# Patient Record
Sex: MALE | Race: WHITE | NOT HISPANIC OR LATINO | Employment: FULL TIME | URBAN - METROPOLITAN AREA
[De-identification: names, ages, dates, MRNs, and addresses within clinical notes are randomized per-mention and may not be internally consistent; named-entity substitution may affect disease eponyms.]

---

## 2023-03-28 LAB
THYROTROPIN (MIU/L) IN SER/PLAS BY DETECTION LIMIT <= 0.05 MIU/L: 1.25 MIU/L (ref 0.44–3.98)
THYROXINE (T4) FREE (NG/DL) IN SER/PLAS: 1.39 NG/DL (ref 0.78–1.48)
TRIIODOTHYRONINE (T3) FREE (PG/ML) IN SER/PLAS: 3.5 PG/ML (ref 2.3–4.2)

## 2023-06-03 PROBLEM — M54.16 LUMBAR RADICULOPATHY: Status: ACTIVE | Noted: 2023-06-03

## 2023-06-03 PROBLEM — K21.9 GASTROESOPHAGEAL REFLUX DISEASE: Status: ACTIVE | Noted: 2023-06-03

## 2023-06-03 PROBLEM — J01.90 ACUTE SINUSITIS: Status: RESOLVED | Noted: 2023-06-03 | Resolved: 2023-06-03

## 2023-06-03 PROBLEM — R10.9 ABDOMINAL PAIN, LEFT LATERAL: Status: RESOLVED | Noted: 2023-06-03 | Resolved: 2023-06-03

## 2023-06-03 PROBLEM — I10 BENIGN ESSENTIAL HYPERTENSION: Status: ACTIVE | Noted: 2023-06-03

## 2023-06-03 PROBLEM — S86.019A ACHILLES RUPTURE: Status: ACTIVE | Noted: 2023-06-03

## 2023-06-03 PROBLEM — B35.1 ONYCHOMYCOSIS: Status: ACTIVE | Noted: 2023-06-03

## 2023-06-03 PROBLEM — H66.92 LEFT OTITIS MEDIA: Status: RESOLVED | Noted: 2023-06-03 | Resolved: 2023-06-03

## 2023-06-03 PROBLEM — K59.00 CONSTIPATION: Status: RESOLVED | Noted: 2023-06-03 | Resolved: 2023-06-03

## 2023-06-03 PROBLEM — K64.9 HEMORRHOID: Status: ACTIVE | Noted: 2023-06-03

## 2023-06-03 PROBLEM — R10.32 LEFT LOWER QUADRANT ABDOMINAL PAIN: Status: RESOLVED | Noted: 2023-06-03 | Resolved: 2023-06-03

## 2023-06-03 PROBLEM — R03.0 ELEVATED BLOOD PRESSURE READING: Status: RESOLVED | Noted: 2023-06-03 | Resolved: 2023-06-03

## 2023-06-03 PROBLEM — K52.9 GASTROENTERITIS, ACUTE: Status: RESOLVED | Noted: 2023-06-03 | Resolved: 2023-06-03

## 2023-06-03 PROBLEM — K57.90 DIVERTICULOSIS: Status: ACTIVE | Noted: 2023-06-03

## 2023-06-03 PROBLEM — M47.815: Status: ACTIVE | Noted: 2023-06-03

## 2023-06-03 PROBLEM — S29.019A THORACIC MYOFASCIAL STRAIN: Status: ACTIVE | Noted: 2023-06-03

## 2023-06-03 PROBLEM — E05.90 HYPERTHYROIDISM: Status: ACTIVE | Noted: 2023-06-03

## 2023-06-03 PROBLEM — R63.5 WEIGHT GAIN: Status: RESOLVED | Noted: 2023-06-03 | Resolved: 2023-06-03

## 2023-06-03 PROBLEM — F41.9 ACUTE ANXIETY: Status: ACTIVE | Noted: 2023-06-03

## 2023-06-03 PROBLEM — L50.9 URTICARIA: Status: ACTIVE | Noted: 2023-06-03

## 2023-06-03 PROBLEM — S63.106A THUMB DISLOCATION: Status: RESOLVED | Noted: 2023-06-03 | Resolved: 2023-06-03

## 2023-06-03 PROBLEM — M47.814 DEGENERATIVE ARTHRITIS OF THORACIC SPINE: Status: ACTIVE | Noted: 2023-06-03

## 2023-06-03 RX ORDER — ALPRAZOLAM 1 MG/1
1 TABLET ORAL 2 TIMES DAILY
COMMUNITY

## 2023-06-13 ENCOUNTER — APPOINTMENT (OUTPATIENT)
Dept: PRIMARY CARE | Facility: CLINIC | Age: 63
End: 2023-06-13
Payer: COMMERCIAL

## 2023-08-22 ENCOUNTER — OFFICE VISIT (OUTPATIENT)
Dept: PRIMARY CARE | Facility: CLINIC | Age: 63
End: 2023-08-22
Payer: COMMERCIAL

## 2023-08-22 VITALS
HEART RATE: 65 BPM | HEIGHT: 71 IN | SYSTOLIC BLOOD PRESSURE: 128 MMHG | WEIGHT: 220 LBS | DIASTOLIC BLOOD PRESSURE: 81 MMHG | BODY MASS INDEX: 30.8 KG/M2

## 2023-08-22 DIAGNOSIS — E66.9 OBESITY (BMI 30.0-34.9): Primary | ICD-10-CM

## 2023-08-22 DIAGNOSIS — F41.9 ACUTE ANXIETY: ICD-10-CM

## 2023-08-22 PROBLEM — R51.9 TEMPORAL HEADACHE: Status: RESOLVED | Noted: 2019-04-17 | Resolved: 2023-08-22

## 2023-08-22 PROBLEM — K11.1 PAROTID GLAND ENLARGEMENT: Status: ACTIVE | Noted: 2019-04-17

## 2023-08-22 PROBLEM — Z98.890 STATUS POST FUNCTIONAL ENDOSCOPIC SINUS SURGERY (FESS): Status: RESOLVED | Noted: 2019-04-17 | Resolved: 2023-08-22

## 2023-08-22 PROBLEM — K76.0 NAFLD (NONALCOHOLIC FATTY LIVER DISEASE): Status: ACTIVE | Noted: 2023-08-22

## 2023-08-22 PROBLEM — R51.9 FACIAL PAIN: Status: RESOLVED | Noted: 2019-04-17 | Resolved: 2023-08-22

## 2023-08-22 PROBLEM — F45.8 BRUXISM: Status: ACTIVE | Noted: 2019-04-17

## 2023-08-22 PROCEDURE — 99213 OFFICE O/P EST LOW 20 MIN: CPT | Performed by: FAMILY MEDICINE

## 2023-08-22 PROCEDURE — 1159F MED LIST DOCD IN RCRD: CPT | Performed by: FAMILY MEDICINE

## 2023-08-22 ASSESSMENT — PATIENT HEALTH QUESTIONNAIRE - PHQ9
1. LITTLE INTEREST OR PLEASURE IN DOING THINGS: NOT AT ALL
SUM OF ALL RESPONSES TO PHQ9 QUESTIONS 1 AND 2: 0
2. FEELING DOWN, DEPRESSED OR HOPELESS: NOT AT ALL

## 2023-08-27 PROBLEM — K11.1 PAROTID GLAND ENLARGEMENT: Status: RESOLVED | Noted: 2019-04-17 | Resolved: 2023-08-27

## 2023-08-27 PROBLEM — I10 BENIGN ESSENTIAL HYPERTENSION: Status: RESOLVED | Noted: 2023-06-03 | Resolved: 2023-08-27

## 2023-08-27 NOTE — PROGRESS NOTES
"Assessment and Plan:  Problem List Items Addressed This Visit       Acute anxiety    Current Assessment & Plan     Continue follow-up with psychiatry and         Obesity (BMI 30.0-34.9) - Primary    Current Assessment & Plan     Watch what you eat and include more vegetables on your plate.  Portion control and exercise will help in loosing weight .   It is recommended to get 150 mins of brisk exercise in a week . 150 mins of exercise per week will help in maintaining your current weight, if you are already working out . Exercise should make your heart rate go up.   Calorie deficit ( spending more calories than eating ) will result in weight loss    A deficit of 500 calories per day in 7 days would results in 1lbs weight loss per week., Aim for 1-2 lbs weight loss per month.   You can reduce the calorie intake by 250 calories and spend 250 calories by working out which would give you a total deficit of 500 calories     No sugary/ sweetened beverages , portion control , dedicated physical activity atleast 5 times a week advised .                 HPI:  Here for follow-up blood pressure is improved denies chest pain shortness of breath continues to lose weight takes Xanax from psychiatry every day anxiety is under control      ROS   Constitutional:  o weight loss. Negative for chills and fever.   HENT: Negative.     Respiratory: Negative.     Cardiovascular: Negative.    Gastrointestinal: Negative.  Negative for nausea and vomiting.   Endocrine: Negative.    Genitourinary: Negative.    Musculoskeletal: Negative.    Skin: Negative.  Negative for rash.   Allergic/Immunologic: Negative.    Neurological: Negative.    Hematological: Negative.    Psychiatric/Behavioral: Negative.     All other systems reviewed and are negative.      Blood Pressure 128/81   Pulse 65   Height 1.803 m (5' 11\")   Weight 99.8 kg (220 lb)   Body Mass Index 30.68 kg/m²   Body mass index is 30.68 kg/m².  Physical Exam    ENT:      Head: " Normocephalic and atraumatic.      Right Ear: Tympanic membrane normal.      Left Ear: Tympanic membrane normal.      Nose: Nose normal.      Mouth/Throat:      Mouth: Mucous membranes are moist.   Eyes:      Pupils: Pupils are equal, round, and reactive to light.   Cardiovascular:      Rate and Rhythm: Normal rate and regular rhythm.      Pulses: Normal pulses.      Heart sounds: Normal heart sounds.   Pulmonary:      Effort: Pulmonary effort is normal.      Breath sounds: Normal breath sounds.   Abdominal:      General: Abdomen is flat. Bowel sounds are normal.      Palpations: Abdomen is soft.   Musculoskeletal:         General: Normal range of motion.      Cervical back: Normal range of motion and neck supple.   Skin:     General: Skin is warm and dry.      Capillary Refill: Capillary refill takes less than 2 seconds.   Neurological:      General: No focal deficit present.      Mental Status: She is alert and oriented to person, place, and time.   Psychiatric:         Mood and Affect: Mood normal.       Active Problem List  Patient Active Problem List   Diagnosis    Achilles rupture    Acute anxiety    Degenerative arthritis of thoracic spine    Diverticulosis    Gastroesophageal reflux disease    Hemorrhoid    Hyperthyroidism    Lumbar radiculopathy    Onychomycosis    Osteoarthritis of thoracolumbar spine without myelopathy or radiculopathy    Thoracic myofascial strain    Urticaria    Bruxism    NAFLD (nonalcoholic fatty liver disease)    Obesity (BMI 30.0-34.9)    Primary localized osteoarthrosis, lower leg       Comprehensive Medical/Surgical/Social/Family History  Past Medical History:   Diagnosis Date    Abdominal pain, left lateral 06/03/2023    Elevated blood pressure reading 06/03/2023    Facial pain 04/17/2019    Gastroenteritis, acute 06/03/2023    Left lower quadrant abdominal pain 06/03/2023    Left otitis media 06/03/2023    Nervousness     Nervous disorder    Obesity, unspecified 11/26/2021     Class 2 obesity with body mass index (BMI) of 35 to 39.9 without comorbidity    Personal history of other diseases of the musculoskeletal system and connective tissue     History of arthritis    Temporal headache 04/17/2019    Thumb dislocation 06/03/2023    Unspecified disorder of nose and nasal sinuses     Sinus problem     Past Surgical History:   Procedure Laterality Date    KNEE ARTHROSCOPY W/ DEBRIDEMENT  08/25/2015    Knee Arthroscopy (Therapeutic)    OTHER SURGICAL HISTORY  08/25/2015    Primary Repair Of Ruptured Achilles Tendon     Social History     Social History Narrative    Not on file       Allergies and Medications  Amoxicillin  Current Outpatient Medications   Medication Instructions    ALPRAZolam (Xanax) 1 mg tablet 1 tablet, oral, 2 times daily

## 2023-11-21 ENCOUNTER — CLINICAL SUPPORT (OUTPATIENT)
Dept: PRIMARY CARE | Facility: CLINIC | Age: 63
End: 2023-11-21
Payer: COMMERCIAL

## 2023-11-21 DIAGNOSIS — Z12.5 SCREENING PSA (PROSTATE SPECIFIC ANTIGEN): ICD-10-CM

## 2023-11-21 DIAGNOSIS — E05.90 HYPERTHYROIDISM: ICD-10-CM

## 2023-11-21 DIAGNOSIS — E66.9 OBESITY (BMI 30.0-34.9): ICD-10-CM

## 2023-11-21 LAB
ALBUMIN SERPL BCP-MCNC: 4 G/DL (ref 3.4–5)
ALP SERPL-CCNC: 63 U/L (ref 33–136)
ALT SERPL W P-5'-P-CCNC: 19 U/L (ref 10–52)
ANION GAP SERPL CALC-SCNC: 11 MMOL/L (ref 10–20)
AST SERPL W P-5'-P-CCNC: 20 U/L (ref 9–39)
BILIRUB SERPL-MCNC: 0.5 MG/DL (ref 0–1.2)
BUN SERPL-MCNC: 17 MG/DL (ref 6–23)
CALCIUM SERPL-MCNC: 8.8 MG/DL (ref 8.6–10.6)
CHLORIDE SERPL-SCNC: 107 MMOL/L (ref 98–107)
CHOLEST SERPL-MCNC: 184 MG/DL (ref 0–199)
CHOLESTEROL/HDL RATIO: 3.9
CO2 SERPL-SCNC: 25 MMOL/L (ref 21–32)
CREAT SERPL-MCNC: 0.94 MG/DL (ref 0.5–1.3)
ERYTHROCYTE [DISTWIDTH] IN BLOOD BY AUTOMATED COUNT: 12.2 % (ref 11.5–14.5)
GFR SERPL CREATININE-BSD FRML MDRD: 90 ML/MIN/1.73M*2
GLUCOSE SERPL-MCNC: 90 MG/DL (ref 74–99)
HCT VFR BLD AUTO: 45.7 % (ref 41–52)
HDLC SERPL-MCNC: 46.6 MG/DL
HGB BLD-MCNC: 15.1 G/DL (ref 13.5–17.5)
LDLC SERPL CALC-MCNC: 123 MG/DL
MCH RBC QN AUTO: 30.7 PG (ref 26–34)
MCHC RBC AUTO-ENTMCNC: 33 G/DL (ref 32–36)
MCV RBC AUTO: 93 FL (ref 80–100)
NON HDL CHOLESTEROL: 137 MG/DL (ref 0–149)
NRBC BLD-RTO: 0 /100 WBCS (ref 0–0)
PLATELET # BLD AUTO: 211 X10*3/UL (ref 150–450)
POTASSIUM SERPL-SCNC: 4.3 MMOL/L (ref 3.5–5.3)
PROT SERPL-MCNC: 6.5 G/DL (ref 6.4–8.2)
PSA SERPL-MCNC: 2.88 NG/ML
RBC # BLD AUTO: 4.92 X10*6/UL (ref 4.5–5.9)
SODIUM SERPL-SCNC: 139 MMOL/L (ref 136–145)
TRIGL SERPL-MCNC: 72 MG/DL (ref 0–149)
TSH SERPL-ACNC: 1.53 MIU/L (ref 0.44–3.98)
VLDL: 14 MG/DL (ref 0–40)
WBC # BLD AUTO: 4.4 X10*3/UL (ref 4.4–11.3)

## 2023-11-21 PROCEDURE — 85027 COMPLETE CBC AUTOMATED: CPT

## 2023-11-21 PROCEDURE — 36415 COLL VENOUS BLD VENIPUNCTURE: CPT

## 2023-11-21 PROCEDURE — 84153 ASSAY OF PSA TOTAL: CPT

## 2023-11-21 PROCEDURE — 84443 ASSAY THYROID STIM HORMONE: CPT

## 2023-11-21 PROCEDURE — 80053 COMPREHEN METABOLIC PANEL: CPT

## 2023-11-21 PROCEDURE — 80061 LIPID PANEL: CPT

## 2024-02-27 ENCOUNTER — APPOINTMENT (OUTPATIENT)
Dept: PRIMARY CARE | Facility: CLINIC | Age: 64
End: 2024-02-27
Payer: COMMERCIAL

## 2024-04-02 ENCOUNTER — OFFICE VISIT (OUTPATIENT)
Dept: PRIMARY CARE | Facility: CLINIC | Age: 64
End: 2024-04-02
Payer: COMMERCIAL

## 2024-04-02 VITALS
SYSTOLIC BLOOD PRESSURE: 127 MMHG | HEART RATE: 78 BPM | BODY MASS INDEX: 32.81 KG/M2 | HEIGHT: 71 IN | DIASTOLIC BLOOD PRESSURE: 84 MMHG | WEIGHT: 234.4 LBS

## 2024-04-02 DIAGNOSIS — E66.9 OBESITY (BMI 30.0-34.9): ICD-10-CM

## 2024-04-02 DIAGNOSIS — F41.9 ACUTE ANXIETY: ICD-10-CM

## 2024-04-02 DIAGNOSIS — K21.9 GASTROESOPHAGEAL REFLUX DISEASE WITHOUT ESOPHAGITIS: Primary | ICD-10-CM

## 2024-04-02 PROCEDURE — 99214 OFFICE O/P EST MOD 30 MIN: CPT | Performed by: FAMILY MEDICINE

## 2024-04-02 PROCEDURE — 1159F MED LIST DOCD IN RCRD: CPT | Performed by: FAMILY MEDICINE

## 2024-04-02 ASSESSMENT — ENCOUNTER SYMPTOMS
DEPRESSION: 0
OCCASIONAL FEELINGS OF UNSTEADINESS: 0
LOSS OF SENSATION IN FEET: 0

## 2024-04-07 NOTE — PROGRESS NOTES
Chief Complaint/HPI:    Ptient is here for follow up obesity HLD GERD Anxiety Sees Psych  Has not been exercising cholesterol was jigh last time. Denies CP SOB      ROS otherwise negative aside from what was mentioned above in HPI.      Patient Active Problem List   Diagnosis    Achilles rupture    Acute anxiety    Degenerative arthritis of thoracic spine    Diverticulosis    Gastroesophageal reflux disease    Hemorrhoid    Hyperthyroidism    Lumbar radiculopathy    Onychomycosis    Osteoarthritis of thoracolumbar spine without myelopathy or radiculopathy    Thoracic myofascial strain    Urticaria    Bruxism    NAFLD (nonalcoholic fatty liver disease)    Obesity (BMI 30.0-34.9)    Primary localized osteoarthrosis, lower leg     Past Medical History:   Diagnosis Date    Abdominal pain, left lateral 2023    Elevated blood pressure reading 2023    Facial pain 2019    Gastroenteritis, acute 2023    Left lower quadrant abdominal pain 2023    Left otitis media 2023    Nervousness     Nervous disorder    Obesity, unspecified 2021    Class 2 obesity with body mass index (BMI) of 35 to 39.9 without comorbidity    Personal history of other diseases of the musculoskeletal system and connective tissue     History of arthritis    Temporal headache 2019    Thumb dislocation 2023    Unspecified disorder of nose and nasal sinuses     Sinus problem     Past Surgical History:   Procedure Laterality Date    KNEE ARTHROSCOPY W/ DEBRIDEMENT  2015    Knee Arthroscopy (Therapeutic)    OTHER SURGICAL HISTORY  2015    Primary Repair Of Ruptured Achilles Tendon     Family History   Problem Relation Name Age of Onset    No Known Problems Mother      No Known Problems Father       Social History     Tobacco Use    Smoking status: Former     Types: Cigarettes     Quit date:      Years since quittin.2    Smokeless tobacco: Never   Substance Use Topics    Alcohol use: Yes     Drug use: Never         ALLERGIES  Allergies   Allergen Reactions    Amoxicillin Rash         MEDICATIONS  Current Outpatient Medications   Medication Sig Dispense Refill    ALPRAZolam (Xanax) 1 mg tablet Take 1 tablet (1 mg) by mouth 2 times a day.       No current facility-administered medications for this visit.         PHYSICAL EXAM  Visit Vitals  Blood Pressure 127/84   Pulse 78     .FLOWAMB[11   .FLOWAMB[14   Body mass index is 32.69 kg/m².  Gen: Alert, NAD  HEENT:  PERRLA, EOMI, conjunctiva and sclera normal in appearance  Respiratory:  Lungs CTAB  Cardiovascular:  Heart RRR. No M/R/G  Neuro:  Gross motor and sensory intact  Skin:  No suspicious lesions present    ASSESSMENT/PLAN  Problem List Items Addressed This Visit       Acute anxiety    Current Assessment & Plan     Continue follow-up with psychiatry and         Gastroesophageal reflux disease - Primary    Current Assessment & Plan     Has been off med Doing weel         Obesity (BMI 30.0-34.9)           Yrn Gaxiola MD

## 2024-05-06 ENCOUNTER — LAB (OUTPATIENT)
Dept: LAB | Facility: LAB | Age: 64
End: 2024-05-06
Payer: COMMERCIAL

## 2024-05-06 ENCOUNTER — OFFICE VISIT (OUTPATIENT)
Dept: ENDOCRINOLOGY | Facility: CLINIC | Age: 64
End: 2024-05-06
Payer: COMMERCIAL

## 2024-05-06 VITALS
BODY MASS INDEX: 34.05 KG/M2 | HEART RATE: 67 BPM | DIASTOLIC BLOOD PRESSURE: 88 MMHG | SYSTOLIC BLOOD PRESSURE: 153 MMHG | HEIGHT: 71 IN | TEMPERATURE: 97.6 F | WEIGHT: 243.2 LBS

## 2024-05-06 DIAGNOSIS — E05.90 HYPERTHYROIDISM: Primary | ICD-10-CM

## 2024-05-06 DIAGNOSIS — E05.90 HYPERTHYROIDISM: ICD-10-CM

## 2024-05-06 PROCEDURE — 1159F MED LIST DOCD IN RCRD: CPT | Performed by: INTERNAL MEDICINE

## 2024-05-06 PROCEDURE — 99214 OFFICE O/P EST MOD 30 MIN: CPT | Performed by: INTERNAL MEDICINE

## 2024-05-06 PROCEDURE — 84443 ASSAY THYROID STIM HORMONE: CPT

## 2024-05-06 PROCEDURE — 84439 ASSAY OF FREE THYROXINE: CPT

## 2024-05-06 PROCEDURE — 84481 FREE ASSAY (FT-3): CPT

## 2024-05-06 PROCEDURE — 36415 COLL VENOUS BLD VENIPUNCTURE: CPT

## 2024-05-06 NOTE — PROGRESS NOTES
History of Present Illness  Mr. CASH is a 64 year year old male with a history of hyperthyroidism (fall 2021), HTN, GERD, lumbar radiculopathy who presents for thyroid.     diagnosed with hyperthyroidism -- diagnosed around 10/2021.  Has gone into remission  is euthyroid  off methimazole  is feeling very good.     _______________________________________________________  Review of Data:     10/26/21 : TSH <0.01, FT4 = 3.2 (H)  11/11/21 : Thyroid US : no nodules  11/24/21 : **TPO > 1000  12/23/21 : TSH=1.76, FT4=0.71 - on methimazole 10mg BID  1/26/22 : TSH=9.56 (H), FT4=0.67, **TSI=1.0, - on methimazole 10mg BID.  2/17/22 : TSH=10.86(H), FT4=0.72(L), FT3=2.5(nl) -- on methimazole 5mg/d  3/14/22 : TSH=1.55, FT4=1.15, FT3=3.2 -- off methimazole  4/4/22/ : TSH=1.95, FT4=1.07, FT3=3.2 -- off methimazole.  5/10.22: TSH=1.61, FT4=1.07, FT3=3.2 -- off methimazole  7/27/22 : TSH=1.29, FT4=1.19, FT3=2.9 -- off methimazole.  11/1/22 : TSH=1.97  3/27/23 : TSH=1.25, FT4=1.39, FT3=3.5 -- off methimazole.      Current Outpatient Medications:     ALPRAZolam (Xanax) 1 mg tablet, Take 1 tablet (1 mg) by mouth 2 times a day., Disp: , Rfl:     ROS:  System: normal  Eyes : no visual changes  Neck : no tenderness, no new lumps/bumps  Respiratory : no SOB  Cardiovascular : no chest pain, no palpitations  Gastro-Intestinal : no abdominal concerns  Neurological : no numbness or tingling in the extremities  Musculoskeletal : no joint paint, no muscle pain  Skin : no unusual rashes  Psychiatric : no depression, no anxiety  See HPI for Endocrine ROS    Past Medical History:   Diagnosis Date    Abdominal pain, left lateral 06/03/2023    Elevated blood pressure reading 06/03/2023    Facial pain 04/17/2019    Gastroenteritis, acute 06/03/2023    Left lower quadrant abdominal pain 06/03/2023    Left otitis media 06/03/2023    Nervousness     Nervous disorder    Obesity, unspecified 11/26/2021    Class 2 obesity with body mass index (BMI) of 35  "to 39.9 without comorbidity    Personal history of other diseases of the musculoskeletal system and connective tissue     History of arthritis    Temporal headache 2019    Thumb dislocation 2023    Unspecified disorder of nose and nasal sinuses     Sinus problem       Past Surgical History:   Procedure Laterality Date    KNEE ARTHROSCOPY W/ DEBRIDEMENT  2015    Knee Arthroscopy (Therapeutic)    OTHER SURGICAL HISTORY  2015    Primary Repair Of Ruptured Achilles Tendon       Social History     Socioeconomic History    Marital status:      Spouse name: Not on file    Number of children: Not on file    Years of education: Not on file    Highest education level: Not on file   Occupational History    Not on file   Tobacco Use    Smoking status: Former     Current packs/day: 0.00     Types: Cigarettes     Quit date:      Years since quittin.3    Smokeless tobacco: Never   Substance and Sexual Activity    Alcohol use: Yes    Drug use: Never    Sexual activity: Not on file   Other Topics Concern    Not on file   Social History Narrative    Not on file     Social Determinants of Health     Financial Resource Strain: Not on file   Food Insecurity: Not on file   Transportation Needs: Not on file   Physical Activity: Not on file   Stress: Not on file   Social Connections: Not on file   Intimate Partner Violence: Not on file   Housing Stability: Not on file       Objective    Physical Exam:  Blood pressure 153/88, pulse 67, temperature 36.4 °C (97.6 °F), height 1.803 m (5' 11\"), weight 110 kg (243 lb 3.2 oz).  General : alert and oriented X3, no acute distress  Eyes : EOMI   Neck : non tender, supple  Thyroid : no palpable nodules  Heart : regular rate and rhythm  ABD : no obvious abnormalities, soft to palpation  Extremities : no edema  Neuro : normal  Other :    Provider Impressions  Hyperthyroidism : predict secondary to Graves.  TPO AB= (+)  TSI = normal  Thyroid US = normal   "   Euthyroid.  is off methimazole and feeling good  repeat TFT's     If normal labs, will have him go back to his pcp.  Lynnette Contreras MD

## 2024-05-07 LAB
T3FREE SERPL-MCNC: 3.1 PG/ML (ref 2.3–4.2)
T4 FREE SERPL-MCNC: 1.21 NG/DL (ref 0.78–1.48)
TSH SERPL-ACNC: 1.92 MIU/L (ref 0.44–3.98)

## 2024-05-09 ENCOUNTER — TELEPHONE (OUTPATIENT)
Dept: ENDOCRINOLOGY | Facility: CLINIC | Age: 64
End: 2024-05-09
Payer: COMMERCIAL

## 2024-05-10 NOTE — TELEPHONE ENCOUNTER
Review of Data:     10/26/21 : TSH <0.01, FT4 = 3.2 (H)  11/11/21 : Thyroid US : no nodules  11/24/21 : **TPO > 1000  12/23/21 : TSH=1.76, FT4=0.71 - on methimazole 10mg BID  1/26/22 : TSH=9.56 (H), FT4=0.67, **TSI=1.0, - on methimazole 10mg BID.  2/17/22 : TSH=10.86(H), FT4=0.72(L), FT3=2.5(nl) -- on methimazole 5mg/d  3/14/22 : TSH=1.55, FT4=1.15, FT3=3.2 -- off methimazole  4/4/22/ : TSH=1.95, FT4=1.07, FT3=3.2 -- off methimazole.  5/10.22: TSH=1.61, FT4=1.07, FT3=3.2 -- off methimazole  7/27/22 : TSH=1.29, FT4=1.19, FT3=2.9 -- off methimazole.  11/1/22 : TSH=1.97  3/27/23 : TSH=1.25, FT4=1.39, FT3=3.5 -- off methimazole.  11/21/23: TSH=1.53  5/6/24: FT4=1.21, TSH=1.92, FT3=3.2 -- off      His thyroid labs remain normal.  He can go back to his pcp.  Thanks,  Lynnette Contreras MD       
Spoke to patient  Relayed provider's message above   Patient verbalized good understanding     
16-Jan-2020 08:30

## 2025-03-03 ENCOUNTER — APPOINTMENT (OUTPATIENT)
Dept: PRIMARY CARE | Facility: CLINIC | Age: 65
End: 2025-03-03
Payer: COMMERCIAL

## 2025-04-21 ENCOUNTER — APPOINTMENT (OUTPATIENT)
Dept: PRIMARY CARE | Facility: CLINIC | Age: 65
End: 2025-04-21
Payer: COMMERCIAL

## 2025-04-21 VITALS
DIASTOLIC BLOOD PRESSURE: 84 MMHG | WEIGHT: 230 LBS | HEART RATE: 76 BPM | SYSTOLIC BLOOD PRESSURE: 138 MMHG | HEIGHT: 70 IN | BODY MASS INDEX: 32.93 KG/M2

## 2025-04-21 DIAGNOSIS — Z13.220 SCREENING FOR LIPID DISORDERS: ICD-10-CM

## 2025-04-21 DIAGNOSIS — Z13.29 SCREENING FOR THYROID DISORDER: ICD-10-CM

## 2025-04-21 DIAGNOSIS — E55.9 VITAMIN D DEFICIENCY: ICD-10-CM

## 2025-04-21 DIAGNOSIS — Z13.1 SCREENING FOR DIABETES MELLITUS: ICD-10-CM

## 2025-04-21 DIAGNOSIS — Z00.00 ROUTINE GENERAL MEDICAL EXAMINATION AT A HEALTH CARE FACILITY: Primary | ICD-10-CM

## 2025-04-21 PROCEDURE — 1159F MED LIST DOCD IN RCRD: CPT | Performed by: FAMILY MEDICINE

## 2025-04-21 PROCEDURE — 3008F BODY MASS INDEX DOCD: CPT | Performed by: FAMILY MEDICINE

## 2025-04-21 PROCEDURE — 99397 PER PM REEVAL EST PAT 65+ YR: CPT | Performed by: FAMILY MEDICINE

## 2025-04-21 RX ORDER — POLYMYXIN B SULFATE AND TRIMETHOPRIM 1; 10000 MG/ML; [USP'U]/ML
SOLUTION OPHTHALMIC
COMMUNITY
Start: 2024-12-13

## 2025-04-21 RX ORDER — IBUPROFEN 200 MG
600 TABLET ORAL EVERY 8 HOURS PRN
COMMUNITY

## 2025-04-21 ASSESSMENT — PATIENT HEALTH QUESTIONNAIRE - PHQ9
SUM OF ALL RESPONSES TO PHQ9 QUESTIONS 1 AND 2: 0
2. FEELING DOWN, DEPRESSED OR HOPELESS: NOT AT ALL
1. LITTLE INTEREST OR PLEASURE IN DOING THINGS: NOT AT ALL

## 2025-04-21 ASSESSMENT — ENCOUNTER SYMPTOMS
DEPRESSION: 0
OCCASIONAL FEELINGS OF UNSTEADINESS: 0
LOSS OF SENSATION IN FEET: 0

## 2025-04-22 LAB
25(OH)D3+25(OH)D2 SERPL-MCNC: 29 NG/ML (ref 30–100)
ALBUMIN SERPL-MCNC: 4.1 G/DL (ref 3.6–5.1)
ALP SERPL-CCNC: 61 U/L (ref 35–144)
ALT SERPL-CCNC: 12 U/L (ref 9–46)
ANION GAP SERPL CALCULATED.4IONS-SCNC: 9 MMOL/L (CALC) (ref 7–17)
AST SERPL-CCNC: 17 U/L (ref 10–35)
BILIRUB SERPL-MCNC: 0.7 MG/DL (ref 0.2–1.2)
BUN SERPL-MCNC: 15 MG/DL (ref 7–25)
CALCIUM SERPL-MCNC: 9.2 MG/DL (ref 8.6–10.3)
CHLORIDE SERPL-SCNC: 105 MMOL/L (ref 98–110)
CHOLEST SERPL-MCNC: 186 MG/DL
CHOLEST/HDLC SERPL: 4.1 (CALC)
CO2 SERPL-SCNC: 25 MMOL/L (ref 20–32)
CREAT SERPL-MCNC: 0.93 MG/DL (ref 0.7–1.35)
EGFRCR SERPLBLD CKD-EPI 2021: 90 ML/MIN/1.73M2
ERYTHROCYTE [DISTWIDTH] IN BLOOD BY AUTOMATED COUNT: 12.9 % (ref 11–15)
EST. AVERAGE GLUCOSE BLD GHB EST-MCNC: 120 MG/DL
EST. AVERAGE GLUCOSE BLD GHB EST-SCNC: 6.6 MMOL/L
GLUCOSE SERPL-MCNC: 87 MG/DL (ref 65–99)
HBA1C MFR BLD: 5.8 %
HCT VFR BLD AUTO: 43.3 % (ref 38.5–50)
HDLC SERPL-MCNC: 45 MG/DL
HGB BLD-MCNC: 14.8 G/DL (ref 13.2–17.1)
LDLC SERPL CALC-MCNC: 124 MG/DL (CALC)
MCH RBC QN AUTO: 30.6 PG (ref 27–33)
MCHC RBC AUTO-ENTMCNC: 34.2 G/DL (ref 32–36)
MCV RBC AUTO: 89.5 FL (ref 80–100)
NONHDLC SERPL-MCNC: 141 MG/DL (CALC)
PLATELET # BLD AUTO: 187 THOUSAND/UL (ref 140–400)
PMV BLD REES-ECKER: 10.1 FL (ref 7.5–12.5)
POTASSIUM SERPL-SCNC: 4 MMOL/L (ref 3.5–5.3)
PROT SERPL-MCNC: 6.8 G/DL (ref 6.1–8.1)
RBC # BLD AUTO: 4.84 MILLION/UL (ref 4.2–5.8)
SODIUM SERPL-SCNC: 139 MMOL/L (ref 135–146)
TRIGL SERPL-MCNC: 74 MG/DL
TSH SERPL-ACNC: 1.35 MIU/L (ref 0.4–4.5)
VIT B12 SERPL-MCNC: 276 PG/ML (ref 200–1100)
WBC # BLD AUTO: 5.5 THOUSAND/UL (ref 3.8–10.8)

## 2025-04-27 PROBLEM — Z00.00 ROUTINE GENERAL MEDICAL EXAMINATION AT A HEALTH CARE FACILITY: Status: ACTIVE | Noted: 2025-04-27

## 2025-04-27 PROBLEM — Z13.220 SCREENING FOR LIPID DISORDERS: Status: ACTIVE | Noted: 2025-04-27

## 2025-04-27 PROBLEM — E55.9 VITAMIN D DEFICIENCY: Status: ACTIVE | Noted: 2025-04-27

## 2025-04-27 PROBLEM — Z13.1 SCREENING FOR DIABETES MELLITUS: Status: ACTIVE | Noted: 2025-04-27

## 2025-04-27 PROBLEM — Z13.29 SCREENING FOR THYROID DISORDER: Status: ACTIVE | Noted: 2025-04-27

## 2025-04-27 NOTE — PROGRESS NOTES
"Subjective   Patient ID: Moose Cuadra is a 65 y.o. male who presents for Establish Care (No concerns to address today).    Last Physical : __1__ Years ago     Pt's PMH, PSH, SH, FH , meds and allergies was obtained / reviewed and updated .     Dental  Visits : Y  Vision issues : N  Hearing issues : N    Immunizations : Y    Diet :  Could be better   Exercise:    Weight concerns :    Alcohol: as noted in   Tobacco: as noted in   Recreational drugs :  None /as noted in      Metabolic screening   - Lipids   - Glucose   Colonoscopy will be due next year  Has gained 10 pounds  ==================================    Visit Vitals  /84   Pulse 76   Ht 1.778 m (5' 10\")   Wt 104 kg (230 lb)   BMI 33.00 kg/m²   Smoking Status Former   BSA 2.27 m²      =====================  Review of Systems:    Constitutional: no chills, no fever and no night sweats.     Eyes: no blurred vision and no eyesight problems.     ENT: no hearing loss, no nasal congestion, no nasal discharge, no hoarseness and no sore throat.     Cardiovascular: no chest pain, no intermittent leg claudication, no lower extremity edema, no palpitations and no syncope.     Respiratory: no cough, no shortness of breath     Gastrointestinal: no abdominal pain,  no nausea, no rectal pain and no vomiting.     Genitourinary: no dysuria, no change in urinary frequency,.     Musculoskeletal: no arthralgias, and no myalgias.     Integumentary: no new skin lesions and no rashes.     Neurological: no difficulty walking, no headache,     Psychiatric: Has anxiety n, no anhedonia and no substance use disorders.            All other systems have been reviewed and are negative for complaint.    =====================================================    Physical exam :    Constitutional: Alert and in no acute distress. Well developed, well nourished.     Eyes: Normal external exam. Pupils were equal in size, round, reactive to light (PERRL) with normal accommodation and " extraocular movements intact (EOMI).     Ears, Nose, Mouth, and Throat: External inspection of ears and nose: Normal.  Otoscopic examination: Normal.      Neck: No neck mass was observed. Supple.     Cardiovascular: Heart rate and rhythm were normal, normal S1 and S2, no gallops, no murmurs and no pericardial rub    Pulmonary: No respiratory distress. Clear bilateral breath sounds.     Abdomen: Soft nontender; no abdominal mass palpated. No organomegaly.     Musculoskeletal: No joint swelling seen, normal movements of all extremities. Range of motion: Normal.  Muscle strength/tone: Normal.      Skin: Normal skin color and pigmentation, normal skin turgor, and no rash.     Neurologic: Deep tendon reflexes were 2+ and symmetric. Sensation: Normal.     Psychiatric: Judgment and insight: Intact. Mood and affect: Normal.    Lymphatic : Cervical/ axillary/ groin Lns Palpable/ non palpable       Assessment/Plan    Problem List Items Addressed This Visit       Routine general medical examination at a health care facility - Primary    Relevant Orders    Vitamin D 25-Hydroxy,Total (for eval of Vitamin D levels) (Completed)    TSH with reflex to Free T4 if abnormal (Completed)    Lipid Panel (Completed)    Comprehensive Metabolic Panel (Completed)    CBC (Completed)    Vitamin B12 (Completed)    Hemoglobin A1c (Completed)    Screening for diabetes mellitus    Relevant Orders    Vitamin D 25-Hydroxy,Total (for eval of Vitamin D levels) (Completed)    TSH with reflex to Free T4 if abnormal (Completed)    Lipid Panel (Completed)    Comprehensive Metabolic Panel (Completed)    CBC (Completed)    Vitamin B12 (Completed)    Hemoglobin A1c (Completed)    Screening for thyroid disorder    Relevant Orders    Vitamin D 25-Hydroxy,Total (for eval of Vitamin D levels) (Completed)    TSH with reflex to Free T4 if abnormal (Completed)    Lipid Panel (Completed)    Comprehensive Metabolic Panel (Completed)    CBC (Completed)    Vitamin B12  (Completed)    Hemoglobin A1c (Completed)    Screening for lipid disorders    Relevant Orders    Vitamin D 25-Hydroxy,Total (for eval of Vitamin D levels) (Completed)    TSH with reflex to Free T4 if abnormal (Completed)    Lipid Panel (Completed)    Comprehensive Metabolic Panel (Completed)    CBC (Completed)    Vitamin B12 (Completed)    Hemoglobin A1c (Completed)    Vitamin D deficiency    Relevant Orders    Vitamin D 25-Hydroxy,Total (for eval of Vitamin D levels) (Completed)    TSH with reflex to Free T4 if abnormal (Completed)    Lipid Panel (Completed)    Comprehensive Metabolic Panel (Completed)    CBC (Completed)    Vitamin B12 (Completed)    Hemoglobin A1c (Completed)

## 2025-05-19 ENCOUNTER — OFFICE VISIT (OUTPATIENT)
Dept: ORTHOPEDIC SURGERY | Facility: CLINIC | Age: 65
End: 2025-05-19
Payer: COMMERCIAL

## 2025-05-19 ENCOUNTER — HOSPITAL ENCOUNTER (OUTPATIENT)
Dept: RADIOLOGY | Facility: CLINIC | Age: 65
Discharge: HOME | End: 2025-05-19
Payer: COMMERCIAL

## 2025-05-19 VITALS — BODY MASS INDEX: 32.93 KG/M2 | WEIGHT: 230 LBS | HEIGHT: 70 IN

## 2025-05-19 DIAGNOSIS — M25.561 RIGHT KNEE PAIN, UNSPECIFIED CHRONICITY: ICD-10-CM

## 2025-05-19 DIAGNOSIS — M17.10 ARTHRITIS OF KNEE: Primary | ICD-10-CM

## 2025-05-19 DIAGNOSIS — M25.461 EFFUSION OF RIGHT KNEE: ICD-10-CM

## 2025-05-19 PROCEDURE — 20610 DRAIN/INJ JOINT/BURSA W/O US: CPT | Mod: RT | Performed by: ORTHOPAEDIC SURGERY

## 2025-05-19 PROCEDURE — 99214 OFFICE O/P EST MOD 30 MIN: CPT | Performed by: ORTHOPAEDIC SURGERY

## 2025-05-19 PROCEDURE — 73562 X-RAY EXAM OF KNEE 3: CPT | Mod: RIGHT SIDE | Performed by: RADIOLOGY

## 2025-05-19 PROCEDURE — 2500000004 HC RX 250 GENERAL PHARMACY W/ HCPCS (ALT 636 FOR OP/ED): Performed by: ORTHOPAEDIC SURGERY

## 2025-05-19 PROCEDURE — 73562 X-RAY EXAM OF KNEE 3: CPT | Mod: RT

## 2025-05-19 ASSESSMENT — PAIN - FUNCTIONAL ASSESSMENT: PAIN_FUNCTIONAL_ASSESSMENT: 0-10

## 2025-05-19 ASSESSMENT — PAIN SCALES - GENERAL: PAINLEVEL_OUTOF10: 3

## 2025-05-19 NOTE — PROGRESS NOTES
65 old male here for right knee pain.  Was seen years ago for Achilles tendon tear.  Has done very well from that.  Very active hiker.  Has had pain in his right knee over the last 4 to 5 weeks.  He noted the pain developed while doing a walk up some hills.  No falls.  No twisting.  No locking or catching.  He has good and bad days with his knee.  Usually worse at the end of the day.  He works at "[employer].  Has had arthroscopy surgery on his left knee but never any surgeries or injuries to his right knee.    WD/WN athletic male  A+O X3  No lymphedema  Inspection of both knees shows symmetric alignment.   No flexion contracture.   Mild medial compartment crepitus through range of motion right knee.   5/5 strength in quads and hamstrings.   Stable varus/valgus stress.   (-) Lachman.   (-) Padma.   Sensation intact to LT.   Good pulses in both legs.   Obvious right knee effusion.   No erythema.    I personally reviewed the following radiographic exams: Right knee shows medial joint space narrowing.  Mild patellofemoral arthrosis.  Obvious effusion.  No acute changes.    Assessment: Right knee arthrosis with effusion, possible meniscal tear.    Plan: Discussed nonoperative and operative options in detail.   Risk and benefits discussed in detail. All questions answered today.  Recovery timeline and expectations discussed in detail.  He is really not having mechanical symptoms.  Would offer cortisone injections which I think will likely resolve the issue.  Discussed possible MRI to evaluate the meniscus if mechanical symptoms develop.  He is comfortable with this plan.  After informed consent under sterile conditions the right knee was injected with a combination of 2cc 2% lidocaine and 40mg Methylprednisolone. Risks and benefits were discussed in detail.  Patient ID: Moose Cuadra is a 65 y.o. male.    L Inj/Asp: R knee on 5/20/2025 10:43 AM  Indications: pain, joint swelling and diagnostic evaluation  Details: 21  G needle, anterolateral approach  Medications: 40 mg methylPREDNISolone acetate 40 mg/mL; 2 mL lidocaine 20 mg/mL (2 %)  Outcome: tolerated well, no immediate complications  Procedure, treatment alternatives, risks and benefits explained, specific risks discussed. Consent was given by the patient. Immediately prior to procedure a time out was called to verify the correct patient, procedure, equipment, support staff and site/side marked as required. Patient was prepped and draped in the usual sterile fashion.

## 2025-05-20 PROCEDURE — 2500000004 HC RX 250 GENERAL PHARMACY W/ HCPCS (ALT 636 FOR OP/ED): Performed by: ORTHOPAEDIC SURGERY

## 2025-05-20 PROCEDURE — 20610 DRAIN/INJ JOINT/BURSA W/O US: CPT | Mod: RT | Performed by: ORTHOPAEDIC SURGERY

## 2025-05-20 RX ORDER — LIDOCAINE HYDROCHLORIDE 20 MG/ML
2 INJECTION, SOLUTION INFILTRATION; PERINEURAL
Status: COMPLETED | OUTPATIENT
Start: 2025-05-20 | End: 2025-05-20

## 2025-05-20 RX ORDER — METHYLPREDNISOLONE ACETATE 40 MG/ML
40 INJECTION, SUSPENSION INTRA-ARTICULAR; INTRALESIONAL; INTRAMUSCULAR; SOFT TISSUE
Status: COMPLETED | OUTPATIENT
Start: 2025-05-20 | End: 2025-05-20

## 2025-05-20 RX ADMIN — METHYLPREDNISOLONE ACETATE 40 MG: 40 INJECTION, SUSPENSION INTRALESIONAL; INTRAMUSCULAR; INTRASYNOVIAL; SOFT TISSUE at 10:43

## 2025-05-20 RX ADMIN — LIDOCAINE HYDROCHLORIDE 2 ML: 20 INJECTION, SOLUTION INFILTRATION; PERINEURAL at 10:43
